# Patient Record
Sex: FEMALE | Race: BLACK OR AFRICAN AMERICAN | NOT HISPANIC OR LATINO | Employment: OTHER | URBAN - METROPOLITAN AREA
[De-identification: names, ages, dates, MRNs, and addresses within clinical notes are randomized per-mention and may not be internally consistent; named-entity substitution may affect disease eponyms.]

---

## 2017-02-02 DIAGNOSIS — Z12.31 ENCOUNTER FOR SCREENING MAMMOGRAM FOR MALIGNANT NEOPLASM OF BREAST: ICD-10-CM

## 2017-02-06 ENCOUNTER — GENERIC CONVERSION - ENCOUNTER (OUTPATIENT)
Dept: OTHER | Facility: OTHER | Age: 77
End: 2017-02-06

## 2018-03-07 NOTE — PROGRESS NOTES
History of Present Illness    Revaccination   Vaccine Information: Vaccine(s) Given (names): Maicol Hager [id number]  Spoke with patient regarding vaccine out of temperature range  Action(s): Pt relocated  Other Information: patient relocated to [place]: Shanell Stewart06  [id number] sd  Active Problems    1  Alkaline phosphatase elevation (790 5) (R74 8)   2  Anemia, unspecified anemia type   3  Encounter for screening mammogram for malignant neoplasm of breast (V76 12)   (Z12 31)   4  Flu vaccine need (V04 81) (Z23)   5  Hepatitis (573 3) (K75 9)   6  Hyperlipidemia (272 4) (E78 5)   7  Hypertension (401 9) (I10)   8  Need for revaccination (V05 9) (Z23)   9  Need for vaccination with 13-polyvalent pneumococcal conjugate vaccine (V03 82) (Z23)   10  Polyp of sigmoid colon (211 3) (D12 5)   11  Sciatica of left side (724 3) (M54 32)   12  Visit for screening mammogram (Z58 09) (Z12 31)    Immunizations  Influenza --- Hosea Shaila: [id number] King: 15-Oct-2012; Alex Chapman: 16-Oct-2013; Series4:  15-Oct-2014; Series5: 20-Oct-2015   PCV --- Hosea Shaila: Permanently Deferred: Medical Deferral, rvac- patient relocated, [id number];  Joshua Carlos: 04-Nov-2014; Series3: 20-Oct-2015   Tdap --- Series1: not sure when   Zoster --- Series1: 2013     Current Meds   1  Atorvastatin Calcium 40 MG Oral Tablet; Take 1 tablet daily   2  Calcium 1200 8656-7690 MG-UNIT Oral Tablet Chewable   3  Fish Oil 1200 MG Oral Capsule   4  Losartan Potassium-HCTZ 100-12 5 MG Oral Tablet; TAKE 1 TABLET DAILY   5  Multi-Vitamin TABS   6  Xalatan 0 005 % Ophthalmic Solution    Allergies    1   No Known Drug Allergies    Signatures   Electronically signed by : KWESI Anthony ; May  4 2017  1:12PM EST